# Patient Record
Sex: MALE | Race: WHITE | URBAN - NONMETROPOLITAN AREA
[De-identification: names, ages, dates, MRNs, and addresses within clinical notes are randomized per-mention and may not be internally consistent; named-entity substitution may affect disease eponyms.]

---

## 2023-07-30 PROBLEM — L20.9 ATOPIC DERMATITIS OF SCALP: Status: ACTIVE | Noted: 2023-07-30

## 2023-07-30 PROBLEM — Z15.01 BRCA1 POSITIVE: Status: ACTIVE | Noted: 2023-07-30

## 2023-07-30 PROBLEM — Z15.09 BRCA1 POSITIVE: Status: ACTIVE | Noted: 2023-07-30

## 2023-07-30 PROBLEM — I10 PRIMARY HYPERTENSION: Status: ACTIVE | Noted: 2023-07-30

## 2023-08-04 ENCOUNTER — TELEPHONE (OUTPATIENT)
Dept: FAMILY MEDICINE CLINIC | Age: 68
End: 2023-08-04

## 2023-08-04 NOTE — TELEPHONE ENCOUNTER
LA -- Patient called on Friday and states that he wasn't sure how long the ZIO monitor was supposed to stay on and that no one had told him. He said that it would be 2 weeks on Monday. I informed him that the usual time to keep it on with my provider is 2 weeks and that I wouldn't be able to ask Dr. Remington Ford or his staff until Monday. He decided he would keep the heart monitor on until Monday and than mail it in.

## 2023-12-03 SDOH — HEALTH STABILITY: PHYSICAL HEALTH: ON AVERAGE, HOW MANY DAYS PER WEEK DO YOU ENGAGE IN MODERATE TO STRENUOUS EXERCISE (LIKE A BRISK WALK)?: 3 DAYS

## 2023-12-03 SDOH — ECONOMIC STABILITY: TRANSPORTATION INSECURITY
IN THE PAST 12 MONTHS, HAS LACK OF TRANSPORTATION KEPT YOU FROM MEETINGS, WORK, OR FROM GETTING THINGS NEEDED FOR DAILY LIVING?: NO

## 2023-12-03 SDOH — ECONOMIC STABILITY: FOOD INSECURITY: WITHIN THE PAST 12 MONTHS, YOU WORRIED THAT YOUR FOOD WOULD RUN OUT BEFORE YOU GOT MONEY TO BUY MORE.: NEVER TRUE

## 2023-12-03 SDOH — ECONOMIC STABILITY: HOUSING INSECURITY
IN THE LAST 12 MONTHS, WAS THERE A TIME WHEN YOU DID NOT HAVE A STEADY PLACE TO SLEEP OR SLEPT IN A SHELTER (INCLUDING NOW)?: NO

## 2023-12-03 SDOH — ECONOMIC STABILITY: INCOME INSECURITY: HOW HARD IS IT FOR YOU TO PAY FOR THE VERY BASICS LIKE FOOD, HOUSING, MEDICAL CARE, AND HEATING?: NOT HARD AT ALL

## 2023-12-03 SDOH — HEALTH STABILITY: PHYSICAL HEALTH: ON AVERAGE, HOW MANY MINUTES DO YOU ENGAGE IN EXERCISE AT THIS LEVEL?: 30 MIN

## 2023-12-03 SDOH — ECONOMIC STABILITY: FOOD INSECURITY: WITHIN THE PAST 12 MONTHS, THE FOOD YOU BOUGHT JUST DIDN'T LAST AND YOU DIDN'T HAVE MONEY TO GET MORE.: NEVER TRUE

## 2023-12-03 ASSESSMENT — PATIENT HEALTH QUESTIONNAIRE - PHQ9
SUM OF ALL RESPONSES TO PHQ QUESTIONS 1-9: 1
SUM OF ALL RESPONSES TO PHQ QUESTIONS 1-9: 1
SUM OF ALL RESPONSES TO PHQ9 QUESTIONS 1 & 2: 1
2. FEELING DOWN, DEPRESSED OR HOPELESS: 1
SUM OF ALL RESPONSES TO PHQ QUESTIONS 1-9: 1
SUM OF ALL RESPONSES TO PHQ QUESTIONS 1-9: 1
1. LITTLE INTEREST OR PLEASURE IN DOING THINGS: 0

## 2023-12-03 ASSESSMENT — LIFESTYLE VARIABLES
HOW MANY STANDARD DRINKS CONTAINING ALCOHOL DO YOU HAVE ON A TYPICAL DAY: 1 OR 2
HOW OFTEN DO YOU HAVE A DRINK CONTAINING ALCOHOL: 4 OR MORE TIMES A WEEK
HAS A RELATIVE, FRIEND, DOCTOR, OR ANOTHER HEALTH PROFESSIONAL EXPRESSED CONCERN ABOUT YOUR DRINKING OR SUGGESTED YOU CUT DOWN: 0
HOW OFTEN DURING THE LAST YEAR HAVE YOU FAILED TO DO WHAT WAS NORMALLY EXPECTED FROM YOU BECAUSE OF DRINKING: 0
HOW OFTEN DURING THE LAST YEAR HAVE YOU NEEDED AN ALCOHOLIC DRINK FIRST THING IN THE MORNING TO GET YOURSELF GOING AFTER A NIGHT OF HEAVY DRINKING: 0
HOW OFTEN DURING THE LAST YEAR HAVE YOU FOUND THAT YOU WERE NOT ABLE TO STOP DRINKING ONCE YOU HAD STARTED: 0
HOW OFTEN DURING THE LAST YEAR HAVE YOU HAD A FEELING OF GUILT OR REMORSE AFTER DRINKING: 0
HOW OFTEN DURING THE LAST YEAR HAVE YOU BEEN UNABLE TO REMEMBER WHAT HAPPENED THE NIGHT BEFORE BECAUSE YOU HAD BEEN DRINKING: 0
HAVE YOU OR SOMEONE ELSE BEEN INJURED AS A RESULT OF YOUR DRINKING: 0

## 2023-12-05 SDOH — HEALTH STABILITY: PHYSICAL HEALTH: ON AVERAGE, HOW MANY MINUTES DO YOU ENGAGE IN EXERCISE AT THIS LEVEL?: 30 MIN

## 2023-12-05 SDOH — HEALTH STABILITY: PHYSICAL HEALTH: ON AVERAGE, HOW MANY DAYS PER WEEK DO YOU ENGAGE IN MODERATE TO STRENUOUS EXERCISE (LIKE A BRISK WALK)?: 3 DAYS

## 2023-12-05 ASSESSMENT — LIFESTYLE VARIABLES
HAVE YOU OR SOMEONE ELSE BEEN INJURED AS A RESULT OF YOUR DRINKING: NO
HOW OFTEN DURING THE LAST YEAR HAVE YOU NEEDED AN ALCOHOLIC DRINK FIRST THING IN THE MORNING TO GET YOURSELF GOING AFTER A NIGHT OF HEAVY DRINKING: NEVER
HAS A RELATIVE, FRIEND, DOCTOR, OR ANOTHER HEALTH PROFESSIONAL EXPRESSED CONCERN ABOUT YOUR DRINKING OR SUGGESTED YOU CUT DOWN: 0
HOW OFTEN DURING THE LAST YEAR HAVE YOU HAD A FEELING OF GUILT OR REMORSE AFTER DRINKING: 0
HOW OFTEN DURING THE LAST YEAR HAVE YOU BEEN UNABLE TO REMEMBER WHAT HAPPENED THE NIGHT BEFORE BECAUSE YOU HAD BEEN DRINKING: 0
HOW OFTEN DURING THE LAST YEAR HAVE YOU FAILED TO DO WHAT WAS NORMALLY EXPECTED FROM YOU BECAUSE OF DRINKING: 0
HOW OFTEN DO YOU HAVE A DRINK CONTAINING ALCOHOL: 5
HAS A RELATIVE, FRIEND, DOCTOR, OR ANOTHER HEALTH PROFESSIONAL EXPRESSED CONCERN ABOUT YOUR DRINKING OR SUGGESTED YOU CUT DOWN: NO
HOW OFTEN DURING THE LAST YEAR HAVE YOU HAD A FEELING OF GUILT OR REMORSE AFTER DRINKING: NEVER
HOW OFTEN DO YOU HAVE SIX OR MORE DRINKS ON ONE OCCASION: 1
HOW MANY STANDARD DRINKS CONTAINING ALCOHOL DO YOU HAVE ON A TYPICAL DAY: 1
HOW OFTEN DURING THE LAST YEAR HAVE YOU FAILED TO DO WHAT WAS NORMALLY EXPECTED FROM YOU BECAUSE OF DRINKING: NEVER
HOW OFTEN DURING THE LAST YEAR HAVE YOU FOUND THAT YOU WERE NOT ABLE TO STOP DRINKING ONCE YOU HAD STARTED: 0
HAVE YOU OR SOMEONE ELSE BEEN INJURED AS A RESULT OF YOUR DRINKING: 0
HOW OFTEN DO YOU HAVE A DRINK CONTAINING ALCOHOL: 4 OR MORE TIMES A WEEK
HOW OFTEN DURING THE LAST YEAR HAVE YOU NEEDED AN ALCOHOLIC DRINK FIRST THING IN THE MORNING TO GET YOURSELF GOING AFTER A NIGHT OF HEAVY DRINKING: 0
HOW OFTEN DURING THE LAST YEAR HAVE YOU FOUND THAT YOU WERE NOT ABLE TO STOP DRINKING ONCE YOU HAD STARTED: NEVER
HOW OFTEN DURING THE LAST YEAR HAVE YOU BEEN UNABLE TO REMEMBER WHAT HAPPENED THE NIGHT BEFORE BECAUSE YOU HAD BEEN DRINKING: NEVER
HOW MANY STANDARD DRINKS CONTAINING ALCOHOL DO YOU HAVE ON A TYPICAL DAY: 1 OR 2

## 2023-12-05 ASSESSMENT — PATIENT HEALTH QUESTIONNAIRE - PHQ9
SUM OF ALL RESPONSES TO PHQ QUESTIONS 1-9: 1
SUM OF ALL RESPONSES TO PHQ QUESTIONS 1-9: 1
1. LITTLE INTEREST OR PLEASURE IN DOING THINGS: 0
SUM OF ALL RESPONSES TO PHQ9 QUESTIONS 1 & 2: 1
2. FEELING DOWN, DEPRESSED OR HOPELESS: 1
SUM OF ALL RESPONSES TO PHQ QUESTIONS 1-9: 1
SUM OF ALL RESPONSES TO PHQ QUESTIONS 1-9: 1

## 2023-12-07 ENCOUNTER — OFFICE VISIT (OUTPATIENT)
Dept: FAMILY MEDICINE CLINIC | Age: 68
End: 2023-12-07
Payer: COMMERCIAL

## 2023-12-07 VITALS
SYSTOLIC BLOOD PRESSURE: 90 MMHG | TEMPERATURE: 97 F | WEIGHT: 193.4 LBS | HEIGHT: 72 IN | DIASTOLIC BLOOD PRESSURE: 50 MMHG | HEART RATE: 77 BPM | BODY MASS INDEX: 26.19 KG/M2 | RESPIRATION RATE: 16 BRPM | OXYGEN SATURATION: 96 %

## 2023-12-07 DIAGNOSIS — Z12.5 SCREENING FOR MALIGNANT NEOPLASM OF PROSTATE: ICD-10-CM

## 2023-12-07 DIAGNOSIS — R49.9 HOARSENESS OR CHANGING VOICE: ICD-10-CM

## 2023-12-07 DIAGNOSIS — R39.15 URINARY URGENCY: ICD-10-CM

## 2023-12-07 DIAGNOSIS — I10 PRIMARY HYPERTENSION: ICD-10-CM

## 2023-12-07 DIAGNOSIS — R25.2 MUSCLE CRAMPS: ICD-10-CM

## 2023-12-07 DIAGNOSIS — Z00.00 INITIAL MEDICARE ANNUAL WELLNESS VISIT: ICD-10-CM

## 2023-12-07 DIAGNOSIS — Z00.00 INITIAL MEDICARE ANNUAL WELLNESS VISIT: Primary | ICD-10-CM

## 2023-12-07 LAB
ALBUMIN SERPL-MCNC: 4.2 G/DL (ref 3.5–5.2)
ALP SERPL-CCNC: 60 U/L (ref 40–130)
ALT SERPL-CCNC: 20 U/L (ref 5–41)
ANION GAP SERPL CALCULATED.3IONS-SCNC: 13 MMOL/L (ref 7–19)
AST SERPL-CCNC: 20 U/L (ref 5–40)
BASOPHILS # BLD: 0.1 K/UL (ref 0–0.2)
BASOPHILS NFR BLD: 1 % (ref 0–1)
BILIRUB SERPL-MCNC: 0.8 MG/DL (ref 0.2–1.2)
BILIRUB UR QL STRIP: NEGATIVE
BUN SERPL-MCNC: 14 MG/DL (ref 8–23)
CALCIUM SERPL-MCNC: 9.7 MG/DL (ref 8.8–10.2)
CHLORIDE SERPL-SCNC: 106 MMOL/L (ref 98–111)
CHOLEST SERPL-MCNC: 183 MG/DL (ref 160–199)
CLARITY UR: CLEAR
CO2 SERPL-SCNC: 22 MMOL/L (ref 22–29)
COLOR UR: YELLOW
CREAT SERPL-MCNC: 0.8 MG/DL (ref 0.5–1.2)
EOSINOPHIL # BLD: 0.2 K/UL (ref 0–0.6)
EOSINOPHIL NFR BLD: 3.3 % (ref 0–5)
ERYTHROCYTE [DISTWIDTH] IN BLOOD BY AUTOMATED COUNT: 13 % (ref 11.5–14.5)
GLUCOSE SERPL-MCNC: 89 MG/DL (ref 74–109)
GLUCOSE UR STRIP.AUTO-MCNC: NEGATIVE MG/DL
HCT VFR BLD AUTO: 47.2 % (ref 42–52)
HDLC SERPL-MCNC: 54 MG/DL (ref 55–121)
HGB BLD-MCNC: 16.3 G/DL (ref 14–18)
HGB UR STRIP.AUTO-MCNC: NEGATIVE MG/L
IMM GRANULOCYTES # BLD: 0 K/UL
KETONES UR STRIP.AUTO-MCNC: NEGATIVE MG/DL
LDLC SERPL CALC-MCNC: 110 MG/DL
LEUKOCYTE ESTERASE UR QL STRIP.AUTO: NEGATIVE
LYMPHOCYTES # BLD: 1.3 K/UL (ref 1.1–4.5)
LYMPHOCYTES NFR BLD: 25.1 % (ref 20–40)
MCH RBC QN AUTO: 31.8 PG (ref 27–31)
MCHC RBC AUTO-ENTMCNC: 34.5 G/DL (ref 33–37)
MCV RBC AUTO: 92 FL (ref 80–94)
MONOCYTES # BLD: 0.5 K/UL (ref 0–0.9)
MONOCYTES NFR BLD: 9.9 % (ref 0–10)
NEUTROPHILS # BLD: 3.1 K/UL (ref 1.5–7.5)
NEUTS SEG NFR BLD: 60.5 % (ref 50–65)
NITRITE UR QL STRIP.AUTO: NEGATIVE
PH UR STRIP.AUTO: 5.5 [PH] (ref 5–8)
PLATELET # BLD AUTO: 183 K/UL (ref 130–400)
PMV BLD AUTO: 9.6 FL (ref 9.4–12.4)
POTASSIUM SERPL-SCNC: 4.1 MMOL/L (ref 3.5–5)
PROT SERPL-MCNC: 6.8 G/DL (ref 6.6–8.7)
PROT UR STRIP.AUTO-MCNC: NEGATIVE MG/DL
PSA SERPL-MCNC: 0.51 NG/ML (ref 0–4)
RBC # BLD AUTO: 5.13 M/UL (ref 4.7–6.1)
SODIUM SERPL-SCNC: 141 MMOL/L (ref 136–145)
SP GR UR STRIP.AUTO: 1.01 (ref 1–1.03)
TRIGL SERPL-MCNC: 96 MG/DL (ref 0–149)
TSH SERPL DL<=0.005 MIU/L-ACNC: 3.51 UIU/ML (ref 0.27–4.2)
UROBILINOGEN UR STRIP.AUTO-MCNC: 0.2 E.U./DL
WBC # BLD AUTO: 5.1 K/UL (ref 4.8–10.8)

## 2023-12-07 PROCEDURE — 1123F ACP DISCUSS/DSCN MKR DOCD: CPT | Performed by: FAMILY MEDICINE

## 2023-12-07 PROCEDURE — 3074F SYST BP LT 130 MM HG: CPT | Performed by: FAMILY MEDICINE

## 2023-12-07 PROCEDURE — 3078F DIAST BP <80 MM HG: CPT | Performed by: FAMILY MEDICINE

## 2023-12-07 PROCEDURE — G0438 PPPS, INITIAL VISIT: HCPCS | Performed by: FAMILY MEDICINE

## 2023-12-07 PROCEDURE — 99214 OFFICE O/P EST MOD 30 MIN: CPT | Performed by: FAMILY MEDICINE

## 2023-12-07 RX ORDER — FLUTICASONE PROPIONATE 50 MCG
2 SPRAY, SUSPENSION (ML) NASAL DAILY
Qty: 16 G | Refills: 0 | Status: SHIPPED | OUTPATIENT
Start: 2023-12-07

## 2023-12-07 RX ORDER — LISINOPRIL 20 MG/1
20 TABLET ORAL DAILY
Qty: 90 TABLET | Refills: 1 | Status: SHIPPED | OUTPATIENT
Start: 2023-12-07

## 2023-12-07 RX ORDER — PANTOPRAZOLE SODIUM 40 MG/1
40 TABLET, DELAYED RELEASE ORAL
Qty: 30 TABLET | Refills: 2 | Status: SHIPPED | OUTPATIENT
Start: 2023-12-07

## 2023-12-07 NOTE — PROGRESS NOTES
Medicare Annual Wellness Visit    Omid Rees is here for Medicare AWV    Assessment & Plan   Initial Medicare annual wellness visit  -     CBC with Auto Differential; Future  -     Comprehensive Metabolic Panel; Future  -     Lipid Panel; Future  -     TSH; Future  -     PSA Screening; Future  Screening for malignant neoplasm of prostate  -     PSA Screening; Future  Hoarseness or changing voice  -     pantoprazole (PROTONIX) 40 MG tablet; Take 1 tablet by mouth every morning (before breakfast), Disp-30 tablet, R-2Normal  -     fluticasone (FLONASE) 50 MCG/ACT nasal spray; 2 sprays by Each Nostril route daily, Disp-16 g, R-0Normal  -     Mona Cerda MD, Otolaryngology, [facility] [place]  Urinary urgency  -     Urinalysis with Reflex to Culture; Future  Primary hypertension  -     lisinopril (PRINIVIL;ZESTRIL) 20 MG tablet; Take 1 tablet by mouth daily, Disp-90 tablet, R-1Normal  Muscle cramps [R25.2]      Will initiate reflux and allergy medicine and monitor and involve ent in further assistance with evaluation of hoarseness. Discussed potential management options for his bladder control issues and patient is going to continue to monitor at this time. Discussed muscles cramps and medications as well as loss of medications that may beneficial.  Stressed importance of continual monitoring of his blood pressure and being diligent with his blood pressure medication. Recommendations for Preventive Services Due: see orders and patient instructions/AVS.  Recommended screening schedule for the next 5-10 years is provided to the patient in written form: see Patient Instructions/AVS.     Return if symptoms worsen or fail to improve, for next scheduled follow up with PCP. Subjective   The following acute and/or chronic problems were also addressed today:  Went to dermatologist and had a spot taken off but otherwise has been good.      He does state that he seems to be getting hoarse and will happen a lot at

## 2024-01-02 DIAGNOSIS — R49.9 HOARSENESS OR CHANGING VOICE: ICD-10-CM

## 2024-01-02 RX ORDER — FLUTICASONE PROPIONATE 50 MCG
2 SPRAY, SUSPENSION (ML) NASAL DAILY
Qty: 16 G | Refills: 0 | Status: SHIPPED | OUTPATIENT
Start: 2024-01-02

## 2024-01-03 ENCOUNTER — TELEPHONE (OUTPATIENT)
Dept: FAMILY MEDICINE CLINIC | Age: 69
End: 2024-01-03

## 2024-01-30 ENCOUNTER — OFFICE VISIT (OUTPATIENT)
Dept: PRIMARY CARE CLINIC | Age: 69
End: 2024-01-30
Payer: COMMERCIAL

## 2024-01-30 VITALS
TEMPERATURE: 97.7 F | BODY MASS INDEX: 26.03 KG/M2 | DIASTOLIC BLOOD PRESSURE: 82 MMHG | HEART RATE: 76 BPM | SYSTOLIC BLOOD PRESSURE: 138 MMHG | OXYGEN SATURATION: 97 % | WEIGHT: 192 LBS

## 2024-01-30 DIAGNOSIS — J01.00 ACUTE NON-RECURRENT MAXILLARY SINUSITIS: Primary | ICD-10-CM

## 2024-01-30 PROCEDURE — 99203 OFFICE O/P NEW LOW 30 MIN: CPT | Performed by: NURSE PRACTITIONER

## 2024-01-30 PROCEDURE — 3079F DIAST BP 80-89 MM HG: CPT | Performed by: NURSE PRACTITIONER

## 2024-01-30 PROCEDURE — 3075F SYST BP GE 130 - 139MM HG: CPT | Performed by: NURSE PRACTITIONER

## 2024-01-30 PROCEDURE — 1123F ACP DISCUSS/DSCN MKR DOCD: CPT | Performed by: NURSE PRACTITIONER

## 2024-01-30 PROCEDURE — 96372 THER/PROPH/DIAG INJ SC/IM: CPT | Performed by: NURSE PRACTITIONER

## 2024-01-30 RX ORDER — DEXAMETHASONE SODIUM PHOSPHATE 10 MG/ML
8 INJECTION INTRAMUSCULAR; INTRAVENOUS ONCE
Status: COMPLETED | OUTPATIENT
Start: 2024-01-30 | End: 2024-01-30

## 2024-01-30 RX ORDER — PREDNISONE 10 MG/1
10 TABLET ORAL 2 TIMES DAILY
Qty: 10 TABLET | Refills: 0 | Status: SHIPPED | OUTPATIENT
Start: 2024-01-30 | End: 2024-02-04

## 2024-01-30 RX ORDER — CEFDINIR 300 MG/1
300 CAPSULE ORAL 2 TIMES DAILY
Qty: 20 CAPSULE | Refills: 0 | Status: SHIPPED | OUTPATIENT
Start: 2024-01-30 | End: 2024-02-09

## 2024-01-30 RX ADMIN — DEXAMETHASONE SODIUM PHOSPHATE 8 MG: 10 INJECTION INTRAMUSCULAR; INTRAVENOUS at 13:39

## 2024-01-30 ASSESSMENT — ENCOUNTER SYMPTOMS
VOMITING: 0
COLOR CHANGE: 0
RHINORRHEA: 0
COUGH: 1
EYE ITCHING: 0
CONSTIPATION: 0
SHORTNESS OF BREATH: 0
NAUSEA: 0
DIARRHEA: 0
BLOOD IN STOOL: 0
SINUS PRESSURE: 1
SORE THROAT: 0
EYE DISCHARGE: 0
WHEEZING: 0
ABDOMINAL PAIN: 0

## 2024-01-30 NOTE — PROGRESS NOTES
[facility]  [facility] [address]  [address]  [place] [id number]  Dept: [phone]  Dept Fax: [phone]  Loc: [phone]    Aleksandar Chamorro is a 69 y.o. male who presents today for his medical conditions/complaints as noted below.  Aleksandar Chamorro is complaining of Cough (Has been coughing up some mucus, had congestion for about a week before this )        HPI:   Cough  This is a new problem. The current episode started in the past 7 days. The problem has been waxing and waning. The problem occurs every few minutes. The cough is Productive of purulent sputum. Associated symptoms include nasal congestion. Pertinent negatives include no chest pain, chills, ear pain, fever, headaches, myalgias, rash, rhinorrhea, sore throat, shortness of breath or wheezing. The symptoms are aggravated by lying down. Treatments tried: mucinex, antihistamine. The treatment provided mild relief.       Past Medical History:   Diagnosis Date    Diverticulosis     Hyperplastic colon polyp     Hypnopompic hallucination     Nummular eczema     AMA (obstructive sleep apnea)     mild       Past Surgical History:   Procedure Laterality Date    GALLBLADDER SURGERY      1999    HERNIA REPAIR Bilateral     R-1992; L-1999    VASECTOMY      1992       Family History   Problem Relation Age of Onset    Alcohol Abuse Mother     COPD Mother     Hypertension Mother     Osteoporosis Mother     Heart Failure Father     Hypertension Father     COPD Father     Alcohol Abuse Father        Social History     Tobacco Use    Smoking status: Never    Smokeless tobacco: Never   Substance Use Topics    Alcohol use: Yes        Current Outpatient Medications   Medication Sig Dispense Refill    predniSONE (DELTASONE) 10 MG tablet Take 1 tablet by mouth 2 times daily for 5 days 10 tablet 0    cefdinir (OMNICEF) 300 MG capsule Take 1 capsule by mouth 2 times daily for 10 days 20 capsule 0    pantoprazole (PROTONIX) 40

## 2024-01-30 NOTE — PROGRESS NOTES
Medication was administered by Malathi German LPN at 1:40 PM.    Medication: dexamethasone  Amount: 8mg  Route: intramuscular  Site: Dorsogluteal right    Patient tolerated well.

## 2024-01-30 NOTE — PATIENT INSTRUCTIONS
- Take full course of antibiotics  - Take prednisone as directed beginning tomorrow  - Dexamethasone injection given today in office  - Increase fluid intake  - Recommended OTC mucinex   - May use OTC claritin/zyrtec and nasal spray such as flonase to help symptoms  - If patient is not improving or developing any new/worsening symptoms then return to clinic or f/u with PCP

## 2024-02-27 ENCOUNTER — OFFICE VISIT (OUTPATIENT)
Dept: ENT CLINIC | Age: 69
End: 2024-02-27
Payer: COMMERCIAL

## 2024-02-27 VITALS
SYSTOLIC BLOOD PRESSURE: 130 MMHG | BODY MASS INDEX: 26.28 KG/M2 | WEIGHT: 194 LBS | HEIGHT: 72 IN | DIASTOLIC BLOOD PRESSURE: 76 MMHG

## 2024-02-27 DIAGNOSIS — K21.9 LARYNGOPHARYNGEAL REFLUX (LPR): Primary | ICD-10-CM

## 2024-02-27 DIAGNOSIS — R49.0 HOARSENESS OF VOICE: ICD-10-CM

## 2024-02-27 DIAGNOSIS — B37.0 ORAL THRUSH: ICD-10-CM

## 2024-02-27 PROCEDURE — 1123F ACP DISCUSS/DSCN MKR DOCD: CPT | Performed by: PHYSICIAN ASSISTANT

## 2024-02-27 PROCEDURE — 99203 OFFICE O/P NEW LOW 30 MIN: CPT | Performed by: PHYSICIAN ASSISTANT

## 2024-02-27 PROCEDURE — 3075F SYST BP GE 130 - 139MM HG: CPT | Performed by: PHYSICIAN ASSISTANT

## 2024-02-27 PROCEDURE — 31575 DIAGNOSTIC LARYNGOSCOPY: CPT | Performed by: PHYSICIAN ASSISTANT

## 2024-02-27 PROCEDURE — 3078F DIAST BP <80 MM HG: CPT | Performed by: PHYSICIAN ASSISTANT

## 2024-02-27 RX ORDER — CLOTRIMAZOLE 10 MG/1
10 LOZENGE ORAL; TOPICAL 3 TIMES DAILY
Qty: 30 TROCHE | Refills: 0 | Status: SHIPPED | OUTPATIENT
Start: 2024-02-27 | End: 2024-03-08

## 2024-02-27 RX ORDER — PANTOPRAZOLE SODIUM 40 MG/1
40 TABLET, DELAYED RELEASE ORAL
Qty: 60 TABLET | Refills: 2 | Status: SHIPPED | OUTPATIENT
Start: 2024-02-27

## 2024-02-27 ASSESSMENT — ENCOUNTER SYMPTOMS
RHINORRHEA: 0
SORE THROAT: 0
TROUBLE SWALLOWING: 0
SINUS PRESSURE: 0
FACIAL SWELLING: 0
PHOTOPHOBIA: 0
EYE PAIN: 0
VOICE CHANGE: 0
SINUS PAIN: 0

## 2024-02-27 NOTE — ASSESSMENT & PLAN NOTE
Hoarseness of voice secondary to LPR based on today's laryngoscopy  Plan: I recommended increasing the patient's Protonix to 40 mg twice a day for total 3 months.  He is to follow-up in 6 weeks for reevaluation.

## 2024-02-27 NOTE — PROGRESS NOTES
[facility] OTOLARYNGOLOGY/ENT  Mr. Chamorro is a pleasant 69-year-old  male that was referred by Dr. Ye Henry due to vocal hoarseness.  He reports he has had this for several months and reports that this has never completely resolved.  He was treated about 6 months ago with single dose of Protonix that improved his symptoms but returned about 2 months later.  Currently he denies any dysphagia to include solids or liquids.  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  Patient denies the use of tobacco products.      Allergies: Patient has no known allergies.      Current Outpatient Medications   Medication Sig Dispense Refill    pantoprazole (PROTONIX) 40 MG tablet Take 1 tablet by mouth 2 times daily (before meals) 60 tablet 2    clotrimazole (MYCELEX) 10 MG luis Take 1 tablet by mouth 3 times daily for 10 days 30 Luis 0    lisinopril (PRINIVIL;ZESTRIL) 20 MG tablet Take 1 tablet by mouth daily 90 tablet 1    clobetasol (TEMOVATE) 0.05 % external solution APPLY TO THE AFFECTED SCALP AREA BY TOPICAL ROUTE 2 TIMES PER DAY IN THE MORNING AND EVENING      Magnesium Chloride (MAGNESIUM DR PO) Take by mouth Picks up otc--250 mg      cetirizine (ZYRTEC) 10 MG tablet Take 1 tablet by mouth daily 90 tablet 1    fluticasone (FLONASE) 50 MCG/ACT nasal spray 2 sprays by Each Nostril route daily (Patient not taking: Reported on 1/30/2024) 16 g 0     No current facility-administered medications for this visit.       Past Surgical History:   Procedure Laterality Date    GALLBLADDER SURGERY      1999    HERNIA REPAIR Bilateral     R-1992; L-1999    VASECTOMY      1992       Past Medical History:   Diagnosis Date    Diverticulosis     GERD (gastroesophageal reflux disease)     Hyperplastic colon polyp     Hypnopompic hallucination     Nummular eczema     AMA (obstructive sleep apnea)     mild       Family History   Problem Relation Age of Onset    Alcohol Abuse Mother

## 2024-02-27 NOTE — ASSESSMENT & PLAN NOTE
Oral thrush-noted today on laryngoscopy secondary to recent antibiotics  Plan: I will place the patient on 10 days of Mycelex.  Will reassess in 6 weeks.

## 2024-04-17 ENCOUNTER — OFFICE VISIT (OUTPATIENT)
Dept: ENT CLINIC | Age: 69
End: 2024-04-17
Payer: COMMERCIAL

## 2024-04-17 VITALS
WEIGHT: 194 LBS | BODY MASS INDEX: 26.28 KG/M2 | DIASTOLIC BLOOD PRESSURE: 74 MMHG | SYSTOLIC BLOOD PRESSURE: 126 MMHG | HEIGHT: 72 IN

## 2024-04-17 DIAGNOSIS — K21.9 LARYNGOPHARYNGEAL REFLUX (LPR): Primary | ICD-10-CM

## 2024-04-17 DIAGNOSIS — R49.0 HOARSENESS OF VOICE: ICD-10-CM

## 2024-04-17 PROCEDURE — 3078F DIAST BP <80 MM HG: CPT | Performed by: PHYSICIAN ASSISTANT

## 2024-04-17 PROCEDURE — 99213 OFFICE O/P EST LOW 20 MIN: CPT | Performed by: PHYSICIAN ASSISTANT

## 2024-04-17 PROCEDURE — 3074F SYST BP LT 130 MM HG: CPT | Performed by: PHYSICIAN ASSISTANT

## 2024-04-17 PROCEDURE — 1123F ACP DISCUSS/DSCN MKR DOCD: CPT | Performed by: PHYSICIAN ASSISTANT

## 2024-04-17 NOTE — PROGRESS NOTES
Mr. Chamorro is a very pleasant 69-year-old  male that presents for a 6-week follow-up after treatment for LPR with vocal hoarseness.  He reports that overall the hoarseness seems to be improving and he is no longer having any notable clearing of the throat.  He does report that he is having some sporadic coughing episodes in the morning, which she believes is related to postnasal drip from seasonal allergies.  He continues to deny any dysphagia to include solids or liquids.  He has tolerated the medication well with no side effects.      Physical examination revealed the patient to have normal TMs canals bilaterally.  Oral exam demonstrated no erythematous changes to the posterior pharynx with normal surface of the tongue.  Neck exam demonstrated no lymphadenopathy or thyromegaly.  No vocal hoarseness was grossly noted.      Impression: Clinically improving LPR-currently on Protonix 40 mg twice a day-first 6 weeks    Plan: I advised the patient to continue the Protonix twice a day for another 6 weeks.  He is to follow-up in 6 weeks for reevaluation.  If he is noted to be back to baseline at that time, we will do a weaning trial to 1 a day.  He was reminded to call if he has any questions or concerns.      Electronically signed by ESTELA RADER PA-C on 4/17/24 at 12:16 PM RIOT

## 2024-04-26 DIAGNOSIS — Q15.9 EYE ABNORMALITY: Primary | ICD-10-CM

## 2024-05-29 ENCOUNTER — OFFICE VISIT (OUTPATIENT)
Dept: ENT CLINIC | Age: 69
End: 2024-05-29
Payer: COMMERCIAL

## 2024-05-29 VITALS
BODY MASS INDEX: 26.01 KG/M2 | SYSTOLIC BLOOD PRESSURE: 124 MMHG | DIASTOLIC BLOOD PRESSURE: 66 MMHG | HEIGHT: 72 IN | WEIGHT: 192 LBS

## 2024-05-29 DIAGNOSIS — R49.0 HOARSENESS OF VOICE: ICD-10-CM

## 2024-05-29 DIAGNOSIS — K21.9 LARYNGOPHARYNGEAL REFLUX (LPR): Primary | ICD-10-CM

## 2024-05-29 PROCEDURE — 3074F SYST BP LT 130 MM HG: CPT | Performed by: PHYSICIAN ASSISTANT

## 2024-05-29 PROCEDURE — 99213 OFFICE O/P EST LOW 20 MIN: CPT | Performed by: PHYSICIAN ASSISTANT

## 2024-05-29 PROCEDURE — 1123F ACP DISCUSS/DSCN MKR DOCD: CPT | Performed by: PHYSICIAN ASSISTANT

## 2024-05-29 PROCEDURE — 3078F DIAST BP <80 MM HG: CPT | Performed by: PHYSICIAN ASSISTANT

## 2024-05-29 RX ORDER — PANTOPRAZOLE SODIUM 40 MG/1
TABLET, DELAYED RELEASE ORAL
Qty: 90 TABLET | Refills: 1 | Status: SHIPPED | OUTPATIENT
Start: 2024-05-29

## 2024-05-29 NOTE — PROGRESS NOTES
Mr. Chamorro is a pleasant 69-year-old  male that presents for a 6-week follow-up after treatment for LPR.  Patient reports that his vocal hoarseness has completely resolved.  He is now able to hit all vocal ranges with the patient singing tenor.  He continues to deny any dysphagia with solid or liquids.  He reports that the globus sensation has pretty much resolved.      Physical examination demonstrated the patient have normal vocals with no hoarseness.  Ears demonstrated normal TMs canals bilaterally.  Oral exam demonstrated no erythematous changes or any masses to the posterior pharynx.  Tongue surface and buccal mucosal region were normal.  Neck exam demonstrated no lymphadenopathy or thyromegaly.      Impression: Clinically improving LPR with resolved vocal hoarseness    Plan: I recommended decreasing the patient's Protonix to 40 mg once a day.  I recommended him taking this at night prior to bedtime.  He is to follow-up in 6 weeks for a final evaluation.  He was reminded to call if he has any questions or concerns.      Electronically signed by ESTELA RADER PA-C on 5/29/24 at 11:10 AM JUAN MIGUEL

## 2024-05-30 RX ORDER — CLOBETASOL PROPIONATE 0.46 MG/ML
SOLUTION TOPICAL
Qty: 50 ML | Refills: 1 | Status: SHIPPED | OUTPATIENT
Start: 2024-05-30

## 2024-06-21 DIAGNOSIS — I10 PRIMARY HYPERTENSION: ICD-10-CM

## 2024-06-24 RX ORDER — LISINOPRIL 20 MG/1
20 TABLET ORAL DAILY
Qty: 90 TABLET | Refills: 1 | Status: SHIPPED | OUTPATIENT
Start: 2024-06-24

## 2024-12-05 ASSESSMENT — PATIENT HEALTH QUESTIONNAIRE - PHQ9
SUM OF ALL RESPONSES TO PHQ QUESTIONS 1-9: 2
2. FEELING DOWN, DEPRESSED OR HOPELESS: SEVERAL DAYS
1. LITTLE INTEREST OR PLEASURE IN DOING THINGS: SEVERAL DAYS
SUM OF ALL RESPONSES TO PHQ QUESTIONS 1-9: 2
1. LITTLE INTEREST OR PLEASURE IN DOING THINGS: SEVERAL DAYS
SUM OF ALL RESPONSES TO PHQ QUESTIONS 1-9: 2
SUM OF ALL RESPONSES TO PHQ9 QUESTIONS 1 & 2: 2
SUM OF ALL RESPONSES TO PHQ QUESTIONS 1-9: 2
2. FEELING DOWN, DEPRESSED OR HOPELESS: SEVERAL DAYS
2. FEELING DOWN, DEPRESSED OR HOPELESS: SEVERAL DAYS
SUM OF ALL RESPONSES TO PHQ9 QUESTIONS 1 & 2: 2
SUM OF ALL RESPONSES TO PHQ QUESTIONS 1-9: 2
SUM OF ALL RESPONSES TO PHQ9 QUESTIONS 1 & 2: 2
SUM OF ALL RESPONSES TO PHQ QUESTIONS 1-9: 2
1. LITTLE INTEREST OR PLEASURE IN DOING THINGS: SEVERAL DAYS
SUM OF ALL RESPONSES TO PHQ QUESTIONS 1-9: 2
SUM OF ALL RESPONSES TO PHQ QUESTIONS 1-9: 2

## 2024-12-11 ENCOUNTER — OFFICE VISIT (OUTPATIENT)
Dept: FAMILY MEDICINE CLINIC | Age: 69
End: 2024-12-11
Payer: COMMERCIAL

## 2024-12-11 ENCOUNTER — NURSE ONLY (OUTPATIENT)
Dept: FAMILY MEDICINE CLINIC | Age: 69
End: 2024-12-11

## 2024-12-11 VITALS
TEMPERATURE: 98.1 F | HEIGHT: 72 IN | OXYGEN SATURATION: 98 % | WEIGHT: 196.6 LBS | BODY MASS INDEX: 26.63 KG/M2 | DIASTOLIC BLOOD PRESSURE: 72 MMHG | SYSTOLIC BLOOD PRESSURE: 128 MMHG | HEART RATE: 66 BPM

## 2024-12-11 DIAGNOSIS — I10 PRIMARY HYPERTENSION: ICD-10-CM

## 2024-12-11 DIAGNOSIS — Z00.00 MEDICARE ANNUAL WELLNESS VISIT, SUBSEQUENT: Primary | ICD-10-CM

## 2024-12-11 DIAGNOSIS — Z12.5 SCREENING FOR MALIGNANT NEOPLASM OF PROSTATE: ICD-10-CM

## 2024-12-11 DIAGNOSIS — Z13.9 SCREENING DUE: Primary | ICD-10-CM

## 2024-12-11 DIAGNOSIS — Z13.220 SCREENING CHOLESTEROL LEVEL: ICD-10-CM

## 2024-12-11 PROBLEM — Z80.9 FAMILY HISTORY OF MALIGNANT NEOPLASM: Status: ACTIVE | Noted: 2020-03-24

## 2024-12-11 PROCEDURE — 1123F ACP DISCUSS/DSCN MKR DOCD: CPT | Performed by: FAMILY MEDICINE

## 2024-12-11 PROCEDURE — 3078F DIAST BP <80 MM HG: CPT | Performed by: FAMILY MEDICINE

## 2024-12-11 PROCEDURE — G0439 PPPS, SUBSEQ VISIT: HCPCS | Performed by: FAMILY MEDICINE

## 2024-12-11 PROCEDURE — 3074F SYST BP LT 130 MM HG: CPT | Performed by: FAMILY MEDICINE

## 2024-12-11 RX ORDER — LISINOPRIL 20 MG/1
20 TABLET ORAL DAILY
Qty: 90 TABLET | Refills: 1 | Status: SHIPPED | OUTPATIENT
Start: 2024-12-11

## 2024-12-11 SDOH — ECONOMIC STABILITY: FOOD INSECURITY: WITHIN THE PAST 12 MONTHS, YOU WORRIED THAT YOUR FOOD WOULD RUN OUT BEFORE YOU GOT MONEY TO BUY MORE.: NEVER TRUE

## 2024-12-11 SDOH — ECONOMIC STABILITY: FOOD INSECURITY: WITHIN THE PAST 12 MONTHS, THE FOOD YOU BOUGHT JUST DIDN'T LAST AND YOU DIDN'T HAVE MONEY TO GET MORE.: NEVER TRUE

## 2024-12-11 SDOH — HEALTH STABILITY: PHYSICAL HEALTH: ON AVERAGE, HOW MANY DAYS PER WEEK DO YOU ENGAGE IN MODERATE TO STRENUOUS EXERCISE (LIKE A BRISK WALK)?: 4 DAYS

## 2024-12-11 SDOH — ECONOMIC STABILITY: INCOME INSECURITY: HOW HARD IS IT FOR YOU TO PAY FOR THE VERY BASICS LIKE FOOD, HOUSING, MEDICAL CARE, AND HEATING?: NOT HARD AT ALL

## 2024-12-11 SDOH — HEALTH STABILITY: PHYSICAL HEALTH: ON AVERAGE, HOW MANY MINUTES DO YOU ENGAGE IN EXERCISE AT THIS LEVEL?: 30 MIN

## 2024-12-11 ASSESSMENT — LIFESTYLE VARIABLES
HAS A RELATIVE, FRIEND, DOCTOR, OR ANOTHER HEALTH PROFESSIONAL EXPRESSED CONCERN ABOUT YOUR DRINKING OR SUGGESTED YOU CUT DOWN: NO
HAS A RELATIVE, FRIEND, DOCTOR, OR ANOTHER HEALTH PROFESSIONAL EXPRESSED CONCERN ABOUT YOUR DRINKING OR SUGGESTED YOU CUT DOWN: NO
HOW OFTEN DURING THE LAST YEAR HAVE YOU HAD A FEELING OF GUILT OR REMORSE AFTER DRINKING: NEVER
HOW OFTEN DURING THE LAST YEAR HAVE YOU FAILED TO DO WHAT WAS NORMALLY EXPECTED FROM YOU BECAUSE OF DRINKING: NEVER
HOW OFTEN DURING THE LAST YEAR HAVE YOU NEEDED AN ALCOHOLIC DRINK FIRST THING IN THE MORNING TO GET YOURSELF GOING AFTER A NIGHT OF HEAVY DRINKING: NEVER
HOW MANY STANDARD DRINKS CONTAINING ALCOHOL DO YOU HAVE ON A TYPICAL DAY: 1 OR 2
HOW OFTEN DURING THE LAST YEAR HAVE YOU NEEDED AN ALCOHOLIC DRINK FIRST THING IN THE MORNING TO GET YOURSELF GOING AFTER A NIGHT OF HEAVY DRINKING: NEVER
HOW OFTEN DURING THE LAST YEAR HAVE YOU BEEN UNABLE TO REMEMBER WHAT HAPPENED THE NIGHT BEFORE BECAUSE YOU HAD BEEN DRINKING: NEVER
HOW OFTEN DO YOU HAVE A DRINK CONTAINING ALCOHOL: 5
HOW MANY STANDARD DRINKS CONTAINING ALCOHOL DO YOU HAVE ON A TYPICAL DAY: 1
HOW OFTEN DO YOU HAVE SIX OR MORE DRINKS ON ONE OCCASION: 1
HOW OFTEN DURING THE LAST YEAR HAVE YOU FOUND THAT YOU WERE NOT ABLE TO STOP DRINKING ONCE YOU HAD STARTED: NEVER
HOW OFTEN DURING THE LAST YEAR HAVE YOU HAD A FEELING OF GUILT OR REMORSE AFTER DRINKING: NEVER
HAVE YOU OR SOMEONE ELSE BEEN INJURED AS A RESULT OF YOUR DRINKING: NO
HOW OFTEN DO YOU HAVE A DRINK CONTAINING ALCOHOL: 4 OR MORE TIMES A WEEK
HOW OFTEN DURING THE LAST YEAR HAVE YOU FAILED TO DO WHAT WAS NORMALLY EXPECTED FROM YOU BECAUSE OF DRINKING: NEVER
HOW OFTEN DURING THE LAST YEAR HAVE YOU BEEN UNABLE TO REMEMBER WHAT HAPPENED THE NIGHT BEFORE BECAUSE YOU HAD BEEN DRINKING: NEVER
HAVE YOU OR SOMEONE ELSE BEEN INJURED AS A RESULT OF YOUR DRINKING: NO

## 2024-12-11 NOTE — PROGRESS NOTES
(196 lb 9.6 oz)   Height: 1.829 m (6')      Body mass index is 26.66 kg/m².      General Appearance: alert and oriented to person, place and time, well developed and well- nourished, in no acute distress  Skin: warm and dry, no rash or erythema  Head: normocephalic and atraumatic  Eyes: pupils equal, round, and reactive to light, extraocular eye movements intact, conjunctivae normal  Neck: supple and non-tender without mass, no thyromegaly or thyroid nodules, no cervical lymphadenopathy  Pulmonary/Chest: clear to auscultation bilaterally- no wheezes, rales or rhonchi, normal air movement, no respiratory distress  Cardiovascular: normal rate, regular rhythm, normal S1 and S2, no murmurs, rubs, clicks, or gallops, distal pulses intact, no carotid bruits  Abdomen: soft, non-tender, non-distended, normal bowel sounds, no masses or organomegaly  Extremities: no cyanosis, clubbing or edema  Musculoskeletal: normal range of motion, no joint swelling, deformity or tenderness          No Known Allergies  Prior to Visit Medications    Medication Sig Taking? Authorizing Provider   lisinopril (PRINIVIL;ZESTRIL) 20 MG tablet Take 1 tablet by mouth daily Yes Ye Henry MD   clobetasol (TEMOVATE) 0.05 % external solution APPLY TO THE AFFECTED SCALP AREA BY TOPICAL ROUTE 2 TIMES PER DAY IN THE MORNING AND EVENING Yes Ye Henry MD   Magnesium Chloride (MAGNESIUM DR PO) Take by mouth Picks up otc--250 mg Yes ProviderKimberly MD   cetirizine (ZYRTEC) 10 MG tablet Take 1 tablet by mouth daily Yes Ye Henry MD       [facility] (Including outside providers/suppliers regularly involved in providing care):   Patient Care Team:  Ye Henry MD as PCP - General (Family Medicine)  Ye Henry MD as PCP - Empaneled Provider      Reviewed and updated this visit:  Allergies  Meds  Med Hx  Surg Hx  Soc Hx  Fam Hx

## 2024-12-11 NOTE — PROGRESS NOTES
Mihaela Laura Social Determinants Of Health (Sdoh) Screening Questionnaire    Question 12/11/2024  1:31 PM CST - Filed by Patient   How hard is it for you to pay for the very basics like food, housing, medical care, and heating? Not hard at all   Within the past 12 months, you worried that your food would run out before you got the money to buy more. Never true   Within the past 12 months, the food you bought just didn't last and you didn't have money to get more. Never true   In the past 12 months, has lack of transportation kept you from meetings, work, or from getting things needed for daily living? No   At any time in the past 12 months, were you homeless or living in a shelter (including now)? No   Would you like resources for any of these topics? No, thank you

## 2024-12-16 DIAGNOSIS — Z12.5 SCREENING FOR MALIGNANT NEOPLASM OF PROSTATE: ICD-10-CM

## 2024-12-16 DIAGNOSIS — Z13.220 SCREENING CHOLESTEROL LEVEL: ICD-10-CM

## 2024-12-16 DIAGNOSIS — I10 PRIMARY HYPERTENSION: ICD-10-CM

## 2024-12-16 DIAGNOSIS — Z00.00 MEDICARE ANNUAL WELLNESS VISIT, SUBSEQUENT: ICD-10-CM

## 2024-12-16 LAB
ALBUMIN SERPL-MCNC: 3.9 G/DL (ref 3.5–5.2)
ALP SERPL-CCNC: 59 U/L (ref 40–129)
ALT SERPL-CCNC: 15 U/L (ref 5–41)
ANION GAP SERPL CALCULATED.3IONS-SCNC: 12 MMOL/L (ref 7–19)
AST SERPL-CCNC: 19 U/L (ref 5–40)
BASOPHILS # BLD: 0.1 K/UL (ref 0–0.2)
BASOPHILS NFR BLD: 1 % (ref 0–1)
BILIRUB SERPL-MCNC: 0.9 MG/DL (ref 0.2–1.2)
BUN SERPL-MCNC: 15 MG/DL (ref 8–23)
CALCIUM SERPL-MCNC: 9.2 MG/DL (ref 8.8–10.2)
CHLORIDE SERPL-SCNC: 104 MMOL/L (ref 98–111)
CHOLEST SERPL-MCNC: 166 MG/DL (ref 0–199)
CO2 SERPL-SCNC: 24 MMOL/L (ref 22–29)
CREAT SERPL-MCNC: 0.9 MG/DL (ref 0.7–1.2)
CREAT UR-MCNC: 97.6 MG/DL (ref 39–259)
EOSINOPHIL # BLD: 0.2 K/UL (ref 0–0.6)
EOSINOPHIL NFR BLD: 3.5 % (ref 0–5)
ERYTHROCYTE [DISTWIDTH] IN BLOOD BY AUTOMATED COUNT: 13.9 % (ref 11.5–14.5)
GLUCOSE SERPL-MCNC: 84 MG/DL (ref 70–99)
HCT VFR BLD AUTO: 44 % (ref 42–52)
HDLC SERPL-MCNC: 50 MG/DL (ref 40–60)
HGB BLD-MCNC: 15.1 G/DL (ref 14–18)
IMM GRANULOCYTES # BLD: 0 K/UL
LDLC SERPL CALC-MCNC: 101 MG/DL
LYMPHOCYTES # BLD: 1.7 K/UL (ref 1.1–4.5)
LYMPHOCYTES NFR BLD: 35.4 % (ref 20–40)
MCH RBC QN AUTO: 30.7 PG (ref 27–31)
MCHC RBC AUTO-ENTMCNC: 34.3 G/DL (ref 33–37)
MCV RBC AUTO: 89.4 FL (ref 80–94)
MICROALBUMIN UR-MCNC: <1.2 MG/DL (ref 0–1.99)
MICROALBUMIN/CREAT UR-RTO: NORMAL MG/G
MONOCYTES # BLD: 0.6 K/UL (ref 0–0.9)
MONOCYTES NFR BLD: 11.6 % (ref 0–10)
NEUTROPHILS # BLD: 2.3 K/UL (ref 1.5–7.5)
NEUTS SEG NFR BLD: 48.3 % (ref 50–65)
PLATELET # BLD AUTO: 178 K/UL (ref 130–400)
PMV BLD AUTO: 9.8 FL (ref 9.4–12.4)
POTASSIUM SERPL-SCNC: 4 MMOL/L (ref 3.5–5)
PROT SERPL-MCNC: 6.3 G/DL (ref 6.4–8.3)
PSA SERPL-MCNC: 0.68 NG/ML (ref 0–4)
RBC # BLD AUTO: 4.92 M/UL (ref 4.7–6.1)
SODIUM SERPL-SCNC: 140 MMOL/L (ref 136–145)
TRIGL SERPL-MCNC: 73 MG/DL (ref 0–149)
TSH SERPL DL<=0.005 MIU/L-ACNC: 3.72 UIU/ML (ref 0.27–4.2)
WBC # BLD AUTO: 4.8 K/UL (ref 4.8–10.8)

## 2025-02-18 ENCOUNTER — OFFICE VISIT (OUTPATIENT)
Age: 70
End: 2025-02-18
Payer: COMMERCIAL

## 2025-02-18 VITALS
SYSTOLIC BLOOD PRESSURE: 118 MMHG | OXYGEN SATURATION: 98 % | DIASTOLIC BLOOD PRESSURE: 68 MMHG | HEART RATE: 72 BPM | TEMPERATURE: 97.9 F | HEIGHT: 72 IN | BODY MASS INDEX: 27.09 KG/M2 | WEIGHT: 200 LBS

## 2025-02-18 DIAGNOSIS — F41.9 ANXIETY AND DEPRESSION: Primary | ICD-10-CM

## 2025-02-18 DIAGNOSIS — F32.A ANXIETY AND DEPRESSION: Primary | ICD-10-CM

## 2025-02-18 PROCEDURE — 99213 OFFICE O/P EST LOW 20 MIN: CPT | Performed by: FAMILY MEDICINE

## 2025-02-18 PROCEDURE — 3078F DIAST BP <80 MM HG: CPT | Performed by: FAMILY MEDICINE

## 2025-02-18 PROCEDURE — 1123F ACP DISCUSS/DSCN MKR DOCD: CPT | Performed by: FAMILY MEDICINE

## 2025-02-18 PROCEDURE — 3074F SYST BP LT 130 MM HG: CPT | Performed by: FAMILY MEDICINE

## 2025-02-18 RX ORDER — PAROXETINE 20 MG/1
20 TABLET, FILM COATED ORAL DAILY
Qty: 30 TABLET | Refills: 3 | Status: SHIPPED | OUTPATIENT
Start: 2025-02-18

## 2025-02-18 SDOH — ECONOMIC STABILITY: FOOD INSECURITY: WITHIN THE PAST 12 MONTHS, THE FOOD YOU BOUGHT JUST DIDN'T LAST AND YOU DIDN'T HAVE MONEY TO GET MORE.: NEVER TRUE

## 2025-02-18 SDOH — ECONOMIC STABILITY: FOOD INSECURITY: WITHIN THE PAST 12 MONTHS, YOU WORRIED THAT YOUR FOOD WOULD RUN OUT BEFORE YOU GOT MONEY TO BUY MORE.: NEVER TRUE

## 2025-02-18 ASSESSMENT — PATIENT HEALTH QUESTIONNAIRE - PHQ9
SUM OF ALL RESPONSES TO PHQ QUESTIONS 1-9: 2
1. LITTLE INTEREST OR PLEASURE IN DOING THINGS: SEVERAL DAYS
SUM OF ALL RESPONSES TO PHQ QUESTIONS 1-9: 2
SUM OF ALL RESPONSES TO PHQ QUESTIONS 1-9: 2
2. FEELING DOWN, DEPRESSED OR HOPELESS: SEVERAL DAYS
SUM OF ALL RESPONSES TO PHQ QUESTIONS 1-9: 2

## 2025-02-18 NOTE — PROGRESS NOTES
Exam  Vitals and nursing note reviewed.   Constitutional:       General: He is not in acute distress.     Appearance: Normal appearance. He is well-developed. He is not diaphoretic.   HENT:      Head: Normocephalic and atraumatic.      Right Ear: External ear normal.      Left Ear: External ear normal.      Nose: Nose normal.      Mouth/Throat:      Mouth: Mucous membranes are moist.      Pharynx: Oropharynx is clear. No oropharyngeal exudate.   Eyes:      General: No scleral icterus.        Right eye: No discharge.         Left eye: No discharge.      Conjunctiva/sclera: Conjunctivae normal.   Neck:      Trachea: No tracheal deviation.   Cardiovascular:      Rate and Rhythm: Normal rate and regular rhythm.      Heart sounds: Normal heart sounds. No murmur heard.     No friction rub. No gallop.   Pulmonary:      Effort: Pulmonary effort is normal. No respiratory distress.      Breath sounds: Normal breath sounds. No wheezing or rales.   Abdominal:      General: Bowel sounds are normal. There is no distension.      Palpations: Abdomen is soft.      Tenderness: There is no abdominal tenderness.   Musculoskeletal:         General: No deformity (No gross deformities of upper or lower extremities) or signs of injury. Normal range of motion.      Cervical back: Normal range of motion and neck supple. No muscular tenderness.      Right lower leg: No edema.      Left lower leg: No edema.   Lymphadenopathy:      Cervical: No cervical adenopathy.   Skin:     General: Skin is warm and dry.      Findings: No erythema or rash.   Neurological:      Mental Status: He is alert and oriented to person, place, and time.      Cranial Nerves: No cranial nerve deficit.   Psychiatric:         Behavior: Behavior normal.         Thought Content: Thought content normal.           Data Reviewed and Summarized       Labs:     Imaging/Testing:        SWATHI JAIMES MD

## 2025-03-07 ASSESSMENT — ENCOUNTER SYMPTOMS
SHORTNESS OF BREATH: 0
ABDOMINAL PAIN: 0
COUGH: 0
RHINORRHEA: 0
CONSTIPATION: 0
BACK PAIN: 0
DIARRHEA: 0
NAUSEA: 0
VOMITING: 0

## 2025-03-10 DIAGNOSIS — F41.9 ANXIETY AND DEPRESSION: ICD-10-CM

## 2025-03-10 DIAGNOSIS — F32.A ANXIETY AND DEPRESSION: ICD-10-CM

## 2025-03-10 RX ORDER — CLOBETASOL PROPIONATE 0.5 MG/ML
SOLUTION TOPICAL
Qty: 50 ML | Refills: 1 | Status: SHIPPED | OUTPATIENT
Start: 2025-03-10

## 2025-03-10 RX ORDER — PAROXETINE 20 MG/1
20 TABLET, FILM COATED ORAL DAILY
Qty: 30 TABLET | Refills: 3 | Status: SHIPPED | OUTPATIENT
Start: 2025-03-10

## 2025-04-02 DIAGNOSIS — F41.9 ANXIETY AND DEPRESSION: ICD-10-CM

## 2025-04-02 DIAGNOSIS — F32.A ANXIETY AND DEPRESSION: ICD-10-CM

## 2025-04-02 RX ORDER — PAROXETINE 20 MG/1
20 TABLET, FILM COATED ORAL DAILY
Qty: 90 TABLET | Refills: 1 | Status: SHIPPED | OUTPATIENT
Start: 2025-04-02

## 2025-06-17 ENCOUNTER — OFFICE VISIT (OUTPATIENT)
Dept: ENT CLINIC | Age: 70
End: 2025-06-17
Payer: COMMERCIAL

## 2025-06-17 VITALS
SYSTOLIC BLOOD PRESSURE: 130 MMHG | WEIGHT: 197 LBS | BODY MASS INDEX: 26.68 KG/M2 | DIASTOLIC BLOOD PRESSURE: 82 MMHG | HEIGHT: 72 IN

## 2025-06-17 DIAGNOSIS — R49.0 HOARSENESS OF VOICE: Primary | ICD-10-CM

## 2025-06-17 DIAGNOSIS — B37.0 ORAL THRUSH: ICD-10-CM

## 2025-06-17 DIAGNOSIS — K21.9 LARYNGOPHARYNGEAL REFLUX (LPR): ICD-10-CM

## 2025-06-17 PROCEDURE — 3079F DIAST BP 80-89 MM HG: CPT | Performed by: PHYSICIAN ASSISTANT

## 2025-06-17 PROCEDURE — 31575 DIAGNOSTIC LARYNGOSCOPY: CPT | Performed by: PHYSICIAN ASSISTANT

## 2025-06-17 PROCEDURE — 99213 OFFICE O/P EST LOW 20 MIN: CPT | Performed by: PHYSICIAN ASSISTANT

## 2025-06-17 PROCEDURE — 1123F ACP DISCUSS/DSCN MKR DOCD: CPT | Performed by: PHYSICIAN ASSISTANT

## 2025-06-17 PROCEDURE — 3075F SYST BP GE 130 - 139MM HG: CPT | Performed by: PHYSICIAN ASSISTANT

## 2025-06-17 RX ORDER — PANTOPRAZOLE SODIUM 40 MG/1
40 TABLET, DELAYED RELEASE ORAL
Qty: 60 TABLET | Refills: 2 | Status: SHIPPED | OUTPATIENT
Start: 2025-06-17

## 2025-06-17 RX ORDER — CLOTRIMAZOLE 10 MG/1
LOZENGE ORAL
Qty: 30 TROCHE | Refills: 0 | Status: SHIPPED | OUTPATIENT
Start: 2025-06-17

## 2025-06-17 ASSESSMENT — ENCOUNTER SYMPTOMS
FACIAL SWELLING: 0
SINUS PRESSURE: 0
RHINORRHEA: 0
TROUBLE SWALLOWING: 0
SINUS PAIN: 0
PHOTOPHOBIA: 0
SORE THROAT: 0
VOICE CHANGE: 0
EYE PAIN: 0

## 2025-06-17 NOTE — PROGRESS NOTES
today on flex laryngoscopy  Plan: I will place the patient on clotrimazole 10 mg 3 times a day for 10 days.  He was reminded to call if this does not resolve.            Orders Placed This Encounter   Procedures    LARYNGOSCOPY,FLEX FIBER,DIAGNOSTIC     The nares were anesthetized with 4% lidocaine aerosol bilaterally.  Each nare was individually evaluated where the patient was found to have no evidence of nasal polyps or masses.  The left side was utilized for the full procedure.  The posterior wall of the nasopharyngeal region demonstrated no remarkable findings.  The scope was then advanced to the oropharyngeal region where the patient was found with normal swallowing with no abnormalities.  The scope was then advanced to the epiglottis where the vocal cords were well-visualized.  The vocal cords were edematous and erythematous with erythematous changes extending over the surface of the glottis.  No masses were noted at this level.  The vocal cords were symmetrical and fully functional.  The tongue was extended and demonstrated thrush to be present to the posterior and mid section of the surface of the tongue.  Patient tolerated the procedure nicely with no complications.       Orders Placed This Encounter   Medications    pantoprazole (PROTONIX) 40 MG tablet     Sig: Take 1 tablet by mouth 2 times daily (before meals)     Dispense:  60 tablet     Refill:  2    clotrimazole (MYCELEX) 10 MG luis     Sig: Placed tablet on tongue and let medication melt completely 3 times a day x 10 days     Dispense:  30 Luis     Refill:  0       Electronically signed by ESTELA RADER PA-C on 6/17/25 at 2:02 PM CDT        Please note that this chart was generated using dragon dictation software.  Although every effort was made to ensure the accuracy of this automated transcription, some errors in transcription may have occurred.

## 2025-06-17 NOTE — ASSESSMENT & PLAN NOTE
Oral thrush-noted today on flex laryngoscopy  Plan: I will place the patient on clotrimazole 10 mg 3 times a day for 10 days.  He was reminded to call if this does not resolve.

## 2025-06-17 NOTE — ASSESSMENT & PLAN NOTE
Recurrent vocal hoarseness secondary to LPR  Plan: I will place the patient back on Protonix 40 mg twice a day.  I will have him follow-up in 6 weeks for reevaluation.  He was reminded to call if he develops any dysphagia or worsening symptoms.

## 2025-06-20 DIAGNOSIS — Z00.00 MEDICARE ANNUAL WELLNESS VISIT, SUBSEQUENT: ICD-10-CM

## 2025-06-20 DIAGNOSIS — I10 PRIMARY HYPERTENSION: ICD-10-CM

## 2025-06-20 RX ORDER — LISINOPRIL 20 MG/1
20 TABLET ORAL DAILY
Qty: 90 TABLET | Refills: 1 | Status: SHIPPED | OUTPATIENT
Start: 2025-06-20

## 2025-08-04 ENCOUNTER — OFFICE VISIT (OUTPATIENT)
Dept: ENT CLINIC | Age: 70
End: 2025-08-04
Payer: COMMERCIAL

## 2025-08-04 VITALS — BODY MASS INDEX: 27.26 KG/M2 | DIASTOLIC BLOOD PRESSURE: 62 MMHG | SYSTOLIC BLOOD PRESSURE: 124 MMHG | WEIGHT: 201 LBS

## 2025-08-04 DIAGNOSIS — K21.9 LARYNGOPHARYNGEAL REFLUX (LPR): Primary | ICD-10-CM

## 2025-08-04 PROCEDURE — 3078F DIAST BP <80 MM HG: CPT | Performed by: PHYSICIAN ASSISTANT

## 2025-08-04 PROCEDURE — 99213 OFFICE O/P EST LOW 20 MIN: CPT | Performed by: PHYSICIAN ASSISTANT

## 2025-08-04 PROCEDURE — 3074F SYST BP LT 130 MM HG: CPT | Performed by: PHYSICIAN ASSISTANT

## 2025-08-04 PROCEDURE — 1123F ACP DISCUSS/DSCN MKR DOCD: CPT | Performed by: PHYSICIAN ASSISTANT

## 2025-08-05 RX ORDER — CLOBETASOL PROPIONATE 0.5 MG/ML
SOLUTION TOPICAL
Qty: 50 ML | Refills: 1 | Status: SHIPPED | OUTPATIENT
Start: 2025-08-05

## 2025-08-06 ENCOUNTER — OFFICE VISIT (OUTPATIENT)
Dept: GASTROENTEROLOGY | Age: 70
End: 2025-08-06
Payer: COMMERCIAL

## 2025-08-06 VITALS
OXYGEN SATURATION: 97 % | BODY MASS INDEX: 27.09 KG/M2 | HEART RATE: 70 BPM | HEIGHT: 72 IN | SYSTOLIC BLOOD PRESSURE: 130 MMHG | DIASTOLIC BLOOD PRESSURE: 80 MMHG | WEIGHT: 200 LBS

## 2025-08-06 DIAGNOSIS — R49.0 HOARSENESS OF VOICE: ICD-10-CM

## 2025-08-06 DIAGNOSIS — R10.13 EPIGASTRIC PAIN: Primary | ICD-10-CM

## 2025-08-06 DIAGNOSIS — K21.9 LARYNGOPHARYNGEAL REFLUX (LPR): ICD-10-CM

## 2025-08-06 DIAGNOSIS — R10.13 EPIGASTRIC PAIN: ICD-10-CM

## 2025-08-06 LAB
ALBUMIN SERPL-MCNC: 4.3 G/DL (ref 3.5–5.2)
ALP SERPL-CCNC: 50 U/L (ref 40–129)
ALT SERPL-CCNC: 17 U/L (ref 10–50)
ANION GAP SERPL CALCULATED.3IONS-SCNC: 12 MMOL/L (ref 8–16)
AST SERPL-CCNC: 23 U/L (ref 10–50)
BASOPHILS # BLD: 0.1 K/UL (ref 0–0.2)
BASOPHILS NFR BLD: 1.2 % (ref 0–1)
BILIRUB SERPL-MCNC: 0.9 MG/DL (ref 0.2–1.2)
BUN SERPL-MCNC: 14 MG/DL (ref 8–23)
CALCIUM SERPL-MCNC: 9.6 MG/DL (ref 8.8–10.2)
CHLORIDE SERPL-SCNC: 104 MMOL/L (ref 98–107)
CO2 SERPL-SCNC: 22 MMOL/L (ref 22–29)
CREAT SERPL-MCNC: 0.8 MG/DL (ref 0.7–1.2)
EOSINOPHIL # BLD: 0.4 K/UL (ref 0–0.6)
EOSINOPHIL NFR BLD: 6.1 % (ref 0–5)
ERYTHROCYTE [DISTWIDTH] IN BLOOD BY AUTOMATED COUNT: 13.5 % (ref 11.5–14.5)
GLUCOSE SERPL-MCNC: 89 MG/DL (ref 70–99)
HCT VFR BLD AUTO: 44.4 % (ref 42–52)
HGB BLD-MCNC: 15 G/DL (ref 14–18)
IMM GRANULOCYTES # BLD: 0 K/UL
LYMPHOCYTES # BLD: 1.4 K/UL (ref 1.1–4.5)
LYMPHOCYTES NFR BLD: 22.7 % (ref 20–40)
MCH RBC QN AUTO: 30.3 PG (ref 27–31)
MCHC RBC AUTO-ENTMCNC: 33.8 G/DL (ref 33–37)
MCV RBC AUTO: 89.7 FL (ref 80–94)
MONOCYTES # BLD: 0.6 K/UL (ref 0–0.9)
MONOCYTES NFR BLD: 10.1 % (ref 0–10)
NEUTROPHILS # BLD: 3.6 K/UL (ref 1.5–7.5)
NEUTS SEG NFR BLD: 59.6 % (ref 50–65)
PLATELET # BLD AUTO: 182 K/UL (ref 130–400)
PMV BLD AUTO: 9.6 FL (ref 9.4–12.4)
POTASSIUM SERPL-SCNC: 3.9 MMOL/L (ref 3.5–5.1)
PROT SERPL-MCNC: 6.8 G/DL (ref 6.4–8.3)
RBC # BLD AUTO: 4.95 M/UL (ref 4.7–6.1)
SODIUM SERPL-SCNC: 138 MMOL/L (ref 136–145)
WBC # BLD AUTO: 6 K/UL (ref 4.8–10.8)

## 2025-08-06 PROCEDURE — 1123F ACP DISCUSS/DSCN MKR DOCD: CPT

## 2025-08-06 PROCEDURE — 3075F SYST BP GE 130 - 139MM HG: CPT

## 2025-08-06 PROCEDURE — 3079F DIAST BP 80-89 MM HG: CPT

## 2025-08-06 PROCEDURE — 99214 OFFICE O/P EST MOD 30 MIN: CPT

## 2025-08-06 RX ORDER — OMEPRAZOLE 40 MG/1
40 CAPSULE, DELAYED RELEASE ORAL DAILY
Qty: 30 CAPSULE | Refills: 11 | Status: SHIPPED | OUTPATIENT
Start: 2025-08-06

## 2025-08-06 RX ORDER — LEVOCETIRIZINE DIHYDROCHLORIDE 5 MG/1
5 TABLET, FILM COATED ORAL NIGHTLY
COMMUNITY

## 2025-08-06 ASSESSMENT — ENCOUNTER SYMPTOMS
TROUBLE SWALLOWING: 0
DIARRHEA: 0
EYES NEGATIVE: 1
ALLERGIC/IMMUNOLOGIC NEGATIVE: 1
CONSTIPATION: 0
CHOKING: 0
RESPIRATORY NEGATIVE: 1
ABDOMINAL PAIN: 1
NAUSEA: 0
VOMITING: 0

## 2025-08-25 ENCOUNTER — HOSPITAL ENCOUNTER (OUTPATIENT)
Age: 70
Setting detail: SPECIMEN
Discharge: HOME | End: 2025-08-25
Payer: COMMERCIAL

## 2025-08-25 ENCOUNTER — APPOINTMENT (OUTPATIENT)
Dept: OPERATING ROOM | Age: 70
End: 2025-08-25
Attending: INTERNAL MEDICINE

## 2025-08-25 ENCOUNTER — HOSPITAL ENCOUNTER (OUTPATIENT)
Age: 70
Setting detail: OUTPATIENT SURGERY
Discharge: HOME | End: 2025-08-25
Attending: INTERNAL MEDICINE | Admitting: INTERNAL MEDICINE

## 2025-08-25 ENCOUNTER — ANESTHESIA EVENT (OUTPATIENT)
Dept: OPERATING ROOM | Age: 70
End: 2025-08-25

## 2025-08-25 ENCOUNTER — ANESTHESIA (OUTPATIENT)
Dept: OPERATING ROOM | Age: 70
End: 2025-08-25

## 2025-08-25 VITALS
BODY MASS INDEX: 26.41 KG/M2 | HEART RATE: 53 BPM | HEIGHT: 72 IN | SYSTOLIC BLOOD PRESSURE: 127 MMHG | RESPIRATION RATE: 18 BRPM | DIASTOLIC BLOOD PRESSURE: 77 MMHG | WEIGHT: 195 LBS | TEMPERATURE: 97.5 F | OXYGEN SATURATION: 97 %

## 2025-08-25 PROCEDURE — 88342 IMHCHEM/IMCYTCHM 1ST ANTB: CPT | Performed by: PATHOLOGY

## 2025-08-25 PROCEDURE — 88305 TISSUE EXAM BY PATHOLOGIST: CPT | Performed by: PATHOLOGY

## 2025-08-25 PROCEDURE — 43239 EGD BIOPSY SINGLE/MULTIPLE: CPT

## 2025-08-25 PROCEDURE — 88342 IMHCHEM/IMCYTCHM 1ST ANTB: CPT

## 2025-08-25 PROCEDURE — 88305 TISSUE EXAM BY PATHOLOGIST: CPT

## 2025-08-25 RX ORDER — LIDOCAINE HYDROCHLORIDE 10 MG/ML
INJECTION, SOLUTION INFILTRATION; PERINEURAL
Status: DISCONTINUED | OUTPATIENT
Start: 2025-08-25 | End: 2025-08-25 | Stop reason: SDUPTHER

## 2025-08-25 RX ORDER — PROPOFOL 10 MG/ML
INJECTION, EMULSION INTRAVENOUS
Status: DISCONTINUED | OUTPATIENT
Start: 2025-08-25 | End: 2025-08-25 | Stop reason: SDUPTHER

## 2025-08-25 RX ORDER — SODIUM CHLORIDE 9 MG/ML
INJECTION, SOLUTION INTRAVENOUS CONTINUOUS
Status: DISCONTINUED | OUTPATIENT
Start: 2025-08-25 | End: 2025-08-25 | Stop reason: HOSPADM

## 2025-08-25 RX ADMIN — LIDOCAINE HYDROCHLORIDE 100 MG: 10 INJECTION, SOLUTION INFILTRATION; PERINEURAL at 13:17

## 2025-08-25 RX ADMIN — PROPOFOL 140 MG: 10 INJECTION, EMULSION INTRAVENOUS at 13:17

## 2025-08-25 RX ADMIN — SODIUM CHLORIDE: 9 INJECTION, SOLUTION INTRAVENOUS at 13:09

## 2025-08-25 ASSESSMENT — PAIN - FUNCTIONAL ASSESSMENT: PAIN_FUNCTIONAL_ASSESSMENT: 0-10

## (undated) DEVICE — Device

## (undated) DEVICE — COLON KIT WITH 1.1 OZ ORCA HYDRA SEAL 2 GOWN

## (undated) DEVICE — FORCEPS BX 240CM 2.4MM L NDL RAD JAW 4 M00513334

## (undated) DEVICE — BRUSH ENDOSCP 2 END CHN HEDGEHOG

## (undated) DEVICE — MANIFOLD SUCT SMK EVAC SGL PRT DISP NEPTUNE 2

## (undated) DEVICE — ADAPTER CLEANING PORPOISE CLEANING

## (undated) DEVICE — BITE BLOCK ENDOSCP AD 60 FR W/ ADJ STRP PLAS GRN BLOX

## (undated) DEVICE — CANNULA NSL AD L7FT DIV O2 CO2 W/ M LUERLOCK TRMPT CONN

## (undated) DEVICE — SUPPLEMENT DIGESTIVE H2O SOL GI-EASE